# Patient Record
Sex: FEMALE | Race: ASIAN | NOT HISPANIC OR LATINO | URBAN - METROPOLITAN AREA
[De-identification: names, ages, dates, MRNs, and addresses within clinical notes are randomized per-mention and may not be internally consistent; named-entity substitution may affect disease eponyms.]

---

## 2022-02-11 ENCOUNTER — APPOINTMENT (RX ONLY)
Dept: URBAN - METROPOLITAN AREA OTHER 8 | Facility: OTHER | Age: 42
Setting detail: DERMATOLOGY
End: 2022-02-11

## 2022-02-11 DIAGNOSIS — L85.3 XEROSIS CUTIS: ICD-10-CM

## 2022-02-11 DIAGNOSIS — L29.89 OTHER PRURITUS: ICD-10-CM

## 2022-02-11 DIAGNOSIS — L29.8 OTHER PRURITUS: ICD-10-CM

## 2022-02-11 DIAGNOSIS — L82.1 OTHER SEBORRHEIC KERATOSIS: ICD-10-CM

## 2022-02-11 PROCEDURE — 99203 OFFICE O/P NEW LOW 30 MIN: CPT

## 2022-02-11 PROCEDURE — ? TREATMENT REGIMEN

## 2022-02-11 PROCEDURE — ? COUNSELING

## 2022-02-11 ASSESSMENT — LOCATION DETAILED DESCRIPTION DERM
LOCATION DETAILED: EPIGASTRIC SKIN
LOCATION DETAILED: LEFT RIB CAGE
LOCATION DETAILED: LEFT SUPERIOR UPPER BACK
LOCATION DETAILED: RIGHT RIB CAGE
LOCATION DETAILED: MIDDLE STERNUM
LOCATION DETAILED: SUBXIPHOID
LOCATION DETAILED: RIGHT MEDIAL UPPER BACK

## 2022-02-11 ASSESSMENT — LOCATION SIMPLE DESCRIPTION DERM
LOCATION SIMPLE: CHEST
LOCATION SIMPLE: LEFT UPPER BACK
LOCATION SIMPLE: ABDOMEN
LOCATION SIMPLE: RIGHT UPPER BACK

## 2022-02-11 ASSESSMENT — LOCATION ZONE DERM: LOCATION ZONE: TRUNK

## 2022-02-11 NOTE — PROCEDURE: TREATMENT REGIMEN
Detail Level: Simple
Plan: After shower, gently pat dry (never rub) and apply fragrance-free moisturizer within first 2 mins while still damp to seal in moisture.\\nUse humidifier at night.
Modify Regimen: Take Lukewarm showers. Begin using Dove sensitive skin bar soap. Use fragrance free detergent. Switch to looser fitting bras and clothing.
Samples Given: Tide (1)& All (1) Detergents (dye and fragrance free )
Samples Given: CeraVe itch cream (1), Eucerin itch relief (1), Aveeno eczema balm (1)
Plan: Recommended annual physical and routine bloodwork as it has been more than 1 year. Recheck in 6 weeks

## 2022-02-11 NOTE — HPI: ITCHING
What Type Of Note Output Would You Prefer (Optional)?: Standard Output
How Did Your Itching Occur?: gradual in onset  (over a period of years)
How Severe Is Your Itching?: moderate
Additional History: Pt states itching started in 2016 when she was pregnant. OBGYN then said it was due to hormonal changes.